# Patient Record
Sex: FEMALE | HISPANIC OR LATINO | URBAN - METROPOLITAN AREA
[De-identification: names, ages, dates, MRNs, and addresses within clinical notes are randomized per-mention and may not be internally consistent; named-entity substitution may affect disease eponyms.]

---

## 2019-03-27 ENCOUNTER — OFFICE VISIT (OUTPATIENT)
Dept: URBAN - METROPOLITAN AREA CLINIC 48 | Facility: CLINIC | Age: 44
End: 2019-03-27
Payer: COMMERCIAL

## 2019-03-27 DIAGNOSIS — H25.813 COMBINED FORMS OF AGE-RELATED CATARACT, BILATERAL: ICD-10-CM

## 2019-03-27 DIAGNOSIS — E11.9 TYPE 2 DIABETES MELLITUS WITHOUT COMPLICATIONS: Primary | ICD-10-CM

## 2019-03-27 PROCEDURE — 92014 COMPRE OPH EXAM EST PT 1/>: CPT | Performed by: OPTOMETRIST

## 2019-03-27 ASSESSMENT — INTRAOCULAR PRESSURE
OD: 14
OS: 15

## 2019-03-27 NOTE — IMPRESSION/PLAN
Impression: Type 2 diabetes mellitus without complications: D53.1. Plan: No retinopathy found on today's examination. Discussed importance of blood sugar, blood pressure and cholesterol control. Letter with today's examination results sent to managing provider.  RTC 1 year for [facility]

## 2019-03-27 NOTE — IMPRESSION/PLAN
Impression: Combined forms of age-related cataract, bilateral: H25.813. Plan: Discussed signs and symptoms of cataract progression. Pt to RTC PRN in the event of changes to visual status. No recommendation for surgery at present time. Will continue to monitor.

## 2022-05-18 ENCOUNTER — OFFICE VISIT (OUTPATIENT)
Dept: URBAN - METROPOLITAN AREA CLINIC 46 | Facility: CLINIC | Age: 47
End: 2022-05-18
Payer: COMMERCIAL

## 2022-05-18 DIAGNOSIS — E11.9 DIABETES MELLITUS TYPE 2 WITHOUT MENTION OF COMPLICATION: Primary | ICD-10-CM

## 2022-05-18 DIAGNOSIS — H16.223 KERATOCONJUNCTIVITIS SICCA, BILATERAL: ICD-10-CM

## 2022-05-18 DIAGNOSIS — H25.13 AGE-RELATED NUCLEAR CATARACT, BILATERAL: ICD-10-CM

## 2022-05-18 DIAGNOSIS — H16.143 PUNCTATE KERATITIS, BILATERAL: ICD-10-CM

## 2022-05-18 DIAGNOSIS — H40.023 OPEN ANGLE WITH BORDERLINE FINDINGS, HIGH RISK, BILATERAL: ICD-10-CM

## 2022-05-18 PROCEDURE — 76514 ECHO EXAM OF EYE THICKNESS: CPT | Performed by: OPTOMETRIST

## 2022-05-18 PROCEDURE — 92014 COMPRE OPH EXAM EST PT 1/>: CPT | Performed by: OPTOMETRIST

## 2022-05-18 PROCEDURE — 92133 CPTRZD OPH DX IMG PST SGM ON: CPT | Performed by: OPTOMETRIST

## 2022-05-18 RX ORDER — ASPIRIN 81 MG/1
81 MG TABLET, COATED ORAL
Refills: 0 | Status: ACTIVE
Start: 2022-05-18

## 2022-05-18 RX ORDER — CLONAZEPAM 0.5 MG/1
0.5 MG TABLET ORAL
Refills: 0 | Status: ACTIVE
Start: 2022-05-18

## 2022-05-18 RX ORDER — FLUOXETINE 40 MG/1
40 MG CAPSULE ORAL
Refills: 0 | Status: ACTIVE
Start: 2022-05-18

## 2022-05-18 RX ORDER — TOPIRAMATE 100 MG/1
100 MG TABLET ORAL
Refills: 0 | Status: ACTIVE
Start: 2022-05-18

## 2022-05-18 RX ORDER — GABAPENTIN 300 MG/1
300 MG CAPSULE ORAL
Refills: 0 | Status: ACTIVE
Start: 2022-05-18

## 2022-05-18 RX ORDER — METFORMIN HCL 1000 MG/1
TABLET, FILM COATED ORAL
Refills: 0 | Status: ACTIVE
Start: 2022-05-18

## 2022-05-18 RX ORDER — GLIPIZIDE 10 MG/1
10 MG TABLET ORAL
Refills: 0 | Status: ACTIVE
Start: 2022-05-18

## 2022-05-18 ASSESSMENT — INTRAOCULAR PRESSURE
OD: 14
OS: 13

## 2022-05-18 NOTE — IMPRESSION/PLAN
Impression: Diabetes mellitus Type 2 without mention of complication: Q66.0. Plan: Diabetes type II: No background retinopathy, no signs of neovascularization noted. Discussed ocular and systemic benefits of blood sugar control. Discussed risks of progression. Patient to call if signs are symptoms should arise.

## 2022-05-18 NOTE — IMPRESSION/PLAN
Impression: Open angle with borderline findings, high risk, bilateral: H40.023. Plan: Discussed findings of today's exam with patient, including diagnosis in detail. Would like patient to return for testing to rule out Glaucoma. Recommend gonioscopy, VF 24-2 and glaucoma evaluation next available. Patient aware this appointment will take longer than the average appointment. Call with questions, changes in vision or new symptoms.

## 2022-05-18 NOTE — IMPRESSION/PLAN
Impression: Keratoconjunctivitis sicca, bilateral: A66.110. Plan: Dry eyes account for the patient's complaints. There is no evidence of permanent changes to the cornea. Explained condition does not have a cure and will need artificial tears for maintenance. Patient instructed to use artificial tears 4-6x/daily. Explained it may take time for eyes to acclimate completely to OTC regimen.

## 2022-10-05 ENCOUNTER — OFFICE VISIT (OUTPATIENT)
Dept: URBAN - METROPOLITAN AREA CLINIC 46 | Facility: CLINIC | Age: 47
End: 2022-10-05
Payer: COMMERCIAL

## 2022-10-05 DIAGNOSIS — H16.143 PUNCTATE KERATITIS, BILATERAL: ICD-10-CM

## 2022-10-05 DIAGNOSIS — H25.13 AGE-RELATED NUCLEAR CATARACT, BILATERAL: ICD-10-CM

## 2022-10-05 DIAGNOSIS — H40.023 OPEN ANGLE WITH BORDERLINE FINDINGS, HIGH RISK, BILATERAL: Primary | ICD-10-CM

## 2022-10-05 DIAGNOSIS — H16.223 KERATOCONJUNCTIVITIS SICCA, BILATERAL: ICD-10-CM

## 2022-10-05 PROCEDURE — 92020 GONIOSCOPY: CPT | Performed by: OPTOMETRIST

## 2022-10-05 PROCEDURE — 92012 INTRM OPH EXAM EST PATIENT: CPT | Performed by: OPTOMETRIST

## 2022-10-05 PROCEDURE — 92083 EXTENDED VISUAL FIELD XM: CPT | Performed by: OPTOMETRIST

## 2022-10-05 ASSESSMENT — INTRAOCULAR PRESSURE
OD: 16
OS: 16

## 2022-10-05 ASSESSMENT — KERATOMETRY
OD: 44.09
OS: 43.63

## 2022-10-05 NOTE — IMPRESSION/PLAN
Impression: Keratoconjunctivitis sicca, bilateral: T77.248. Plan: Dry eyes account for the patient's complaints. There is no evidence of permanent changes to the cornea. Explained condition does not have a cure and will need artificial tears for maintenance, recommended to use 4-6 times a day.

## 2022-10-05 NOTE — IMPRESSION/PLAN
Impression: Open angle with borderline findings, high risk, bilateral: H40.023. Plan: Explained that currently there is no obvious vision loss from glaucoma. Condition and IOP stable without medication. Careful observation was discussed and is strongly recommended to prevent possible future vision loss. Will continue to monitor intraocular pressure and testing in clinic at regular intervals. No treatment is being implemented at this time. Gonio and VF 24-2 preformed at today's visit, baseline testing to monitor for any advancements or progressions.

## 2024-09-25 ENCOUNTER — OFFICE VISIT (OUTPATIENT)
Dept: URBAN - METROPOLITAN AREA CLINIC 48 | Facility: CLINIC | Age: 49
End: 2024-09-25
Payer: COMMERCIAL

## 2024-09-25 DIAGNOSIS — H25.13 AGE-RELATED NUCLEAR CATARACT, BILATERAL: ICD-10-CM

## 2024-09-25 DIAGNOSIS — E11.9 TYPE 2 DIABETES MELLITUS WITHOUT COMPLICATIONS: Primary | ICD-10-CM

## 2024-09-25 PROCEDURE — 92134 CPTRZ OPH DX IMG PST SGM RTA: CPT | Performed by: OPTOMETRIST

## 2024-09-25 PROCEDURE — 99204 OFFICE O/P NEW MOD 45 MIN: CPT | Performed by: OPTOMETRIST

## 2024-09-25 ASSESSMENT — INTRAOCULAR PRESSURE
OD: 19
OS: 17